# Patient Record
Sex: MALE | Race: WHITE
[De-identification: names, ages, dates, MRNs, and addresses within clinical notes are randomized per-mention and may not be internally consistent; named-entity substitution may affect disease eponyms.]

---

## 2020-01-21 PROBLEM — Z00.00 ENCOUNTER FOR PREVENTIVE HEALTH EXAMINATION: Status: ACTIVE | Noted: 2020-01-21

## 2020-01-22 ENCOUNTER — APPOINTMENT (OUTPATIENT)
Dept: FAMILY MEDICINE | Facility: CLINIC | Age: 30
End: 2020-01-22
Payer: COMMERCIAL

## 2020-01-22 VITALS
BODY MASS INDEX: 26.61 KG/M2 | OXYGEN SATURATION: 100 % | SYSTOLIC BLOOD PRESSURE: 120 MMHG | DIASTOLIC BLOOD PRESSURE: 80 MMHG | HEIGHT: 78 IN | WEIGHT: 230 LBS | HEART RATE: 77 BPM

## 2020-01-22 DIAGNOSIS — Z82.5 FAMILY HISTORY OF ASTHMA AND OTHER CHRONIC LOWER RESPIRATORY DISEASES: ICD-10-CM

## 2020-01-22 DIAGNOSIS — L70.0 ACNE VULGARIS: ICD-10-CM

## 2020-01-22 DIAGNOSIS — Z78.9 OTHER SPECIFIED HEALTH STATUS: ICD-10-CM

## 2020-01-22 DIAGNOSIS — F45.0 ANXIETY DISORDER, UNSPECIFIED: ICD-10-CM

## 2020-01-22 DIAGNOSIS — Z76.89 PERSONS ENCOUNTERING HEALTH SERVICES IN OTHER SPECIFIED CIRCUMSTANCES: ICD-10-CM

## 2020-01-22 DIAGNOSIS — R07.89 OTHER CHEST PAIN: ICD-10-CM

## 2020-01-22 DIAGNOSIS — K21.9 GASTRO-ESOPHAGEAL REFLUX DISEASE W/OUT ESOPHAGITIS: ICD-10-CM

## 2020-01-22 DIAGNOSIS — F41.9 ANXIETY DISORDER, UNSPECIFIED: ICD-10-CM

## 2020-01-22 DIAGNOSIS — Z87.891 PERSONAL HISTORY OF NICOTINE DEPENDENCE: ICD-10-CM

## 2020-01-22 PROCEDURE — 99205 OFFICE O/P NEW HI 60 MIN: CPT | Mod: 25

## 2020-01-22 PROCEDURE — G0444 DEPRESSION SCREEN ANNUAL: CPT | Mod: 59

## 2020-01-22 NOTE — PHYSICAL EXAM
[Normal Oropharynx] : the oropharynx was normal [Thyroid Normal, No Nodules] : the thyroid was normal and there were no nodules present [Normal TMs] : both tympanic membranes were normal [Normal Rate] : normal rate  [Clear to Auscultation] : lungs were clear to auscultation bilaterally [No Edema] : there was no peripheral edema [No CVA Tenderness] : no CVA  tenderness [Regular Rhythm] : with a regular rhythm [No Spinal Tenderness] : no spinal tenderness [Normal] : normal gait, coordination grossly intact, no focal deficits and deep tendon reflexes were 2+ and symmetric [de-identified] : no chest wall tenderness [de-identified] : scarring on the back. A few scabbed lesions noted. Skin is slightly oily.

## 2020-01-22 NOTE — HISTORY OF PRESENT ILLNESS
[FreeTextEntry1] : Establish care [de-identified] : Mr. EFRA MARES is a 29 year old male here today to establish care.  \par Hasn't had a regular examination for years. \par c/o non-exertional chest pressure daily for the last few months. \par c/o occasional nighttime acid taste in his mouth.\par c/o years of back "acne". More recently the lesions on his back come and go quickly.\par Eats a lot of spicy foods.\par Pt aware of self exams of testicles- says he hasn't felt anything unusual.\par \par Flu: No\par TdaP: College ( 8 yrs)\par NEFTALI: Yes\par

## 2020-01-22 NOTE — ASSESSMENT
[FreeTextEntry1] : - patient's complaints appear to be in part related to somatization from stress\par - likely acid reflux from internalized anxiety  and/or 2ndary to spicy diet\par

## 2020-01-22 NOTE — REVIEW OF SYSTEMS
[Heartburn] : heartburn [Anxiety] : anxiety [Negative] : Neurological [Insomnia] : no insomnia [FreeTextEntry5] : atypical chest pressure.  [de-identified] : back "acne"

## 2020-01-22 NOTE — PLAN
[FreeTextEntry1] : Reassurance and observation\par Encouraged stress reducing through exercise and meditation.\par Advised limiting spicy foods and/or adding H2 blocker before spicy meals.\par Encouraged limited intake of oils and using benzoyl peroxide or astringent to back to reduce further outbreaks.  \par Consider screening labs.\par Discussed importance of monthly self examination of the testicles. Instructed patient on proper way to examine himself.\par F/U as needed,\par

## 2020-01-22 NOTE — HEALTH RISK ASSESSMENT
[Good] : ~his/her~ current health as good [Very Good] : ~his/her~  mood as very good [2 - 3 times a week (3 pts)] : 2 - 3  times a week (3 points) [3 or 4 (1 pt)] : 3 or 4  (1 point) [Monthly (2 pts)] : Monthly (2 points) [Yes] : In the past 12 months have you used drugs other than those required for medical reasons? Yes [No falls in past year] : Patient reported no falls in the past year [0] : 1) Little interest or pleasure doing things: Not at all (0) [HIV test declined] : HIV test declined [Hepatitis C test declined] : Hepatitis C test declined [Change in mental status noted] : Change in mental status noted [None] : None [With Significant Other] : lives with significant other [Employed] : employed [College] : College [] :  [Sexually Active] : sexually active [Feels Safe at Home] : Feels safe at home [Fully functional (using the telephone, shopping, preparing meals, housekeeping, doing laundry, using] : Fully functional and needs no help or supervision to perform IADLs (using the telephone, shopping, preparing meals, housekeeping, doing laundry, using transportation, managing medications and managing finances) [Fully functional (bathing, dressing, toileting, transferring, walking, feeding)] : Fully functional (bathing, dressing, toileting, transferring, walking, feeding) [Reports normal functional visual acuity (ie: able to read med bottle)] : Reports normal functional visual acuity [Carbon Monoxide Detector] : carbon monoxide detector [Smoke Detector] : smoke detector [Safety elements used in home] : safety elements used in home [Seat Belt] :  uses seat belt [Patient declined Retinal Exam] : Patient declined Retinal Exam. [Patient/Caregiver not ready to engage] : Patient/Caregiver not ready to engage [# Of Children ___] : has [unfilled] children [] : No [FreeTextEntry1] : chest pressure, acid in throat [de-identified] : former [YearQuit] : 2013 [de-identified] : gym,martial arts [de-identified] : marijuana occasionally [de-identified] : normal [[id number]] : 0 [Language] : denies difficulty with language [Behavior] : denies difficulty with behavior [Learning/Retaining New Information] : denies difficulty learning/retaining new information [Handling Complex Tasks] : denies difficulty handling complex tasks [Reasoning] : denies difficulty with reasoning [Spatial Ability and Orientation] : denies difficulty with spatial ability and orientation [Reports changes in vision] : Reports no changes in vision [Reports changes in hearing] : Reports no changes in hearing [Reports changes in dental health] : Reports no changes in dental health [Guns at Home] : no guns at home [Sunscreen] : does not use sunscreen [Travel to Developing Areas] : does not  travel to developing areas [TB Exposure] : is not being exposed to tuberculosis [Caregiver Concerns] : does not have caregiver concerns [de-identified] : stressed [AdvancecareDate] : 01/20